# Patient Record
Sex: FEMALE | Race: OTHER | HISPANIC OR LATINO | URBAN - METROPOLITAN AREA
[De-identification: names, ages, dates, MRNs, and addresses within clinical notes are randomized per-mention and may not be internally consistent; named-entity substitution may affect disease eponyms.]

---

## 2017-02-17 ENCOUNTER — EMERGENCY (EMERGENCY)
Facility: HOSPITAL | Age: 42
LOS: 1 days | Discharge: HOME | End: 2017-02-17
Attending: EMERGENCY MEDICINE | Admitting: EMERGENCY MEDICINE
Payer: COMMERCIAL

## 2017-02-17 VITALS
TEMPERATURE: 98 F | RESPIRATION RATE: 18 BRPM | HEART RATE: 68 BPM | DIASTOLIC BLOOD PRESSURE: 69 MMHG | OXYGEN SATURATION: 99 % | SYSTOLIC BLOOD PRESSURE: 137 MMHG | WEIGHT: 175.05 LBS

## 2017-02-17 DIAGNOSIS — H92.01 OTALGIA, RIGHT EAR: ICD-10-CM

## 2017-02-17 DIAGNOSIS — H60.91 UNSPECIFIED OTITIS EXTERNA, RIGHT EAR: ICD-10-CM

## 2017-02-17 PROCEDURE — 99283 EMERGENCY DEPT VISIT LOW MDM: CPT

## 2017-02-17 RX ORDER — COLISTIN SULFATE, NEOMYCIN SULFATE, THONZONIUM BROMIDE AND HYDROCORTISONE ACETATE 3; 3.3; .5; 1 MG/ML; MG/ML; MG/ML; MG/ML
5 SUSPENSION AURICULAR (OTIC)
Qty: 1 | Refills: 0 | OUTPATIENT
Start: 2017-02-17 | End: 2017-02-24

## 2017-02-17 NOTE — ED PROVIDER NOTE - OBJECTIVE STATEMENT
pt to ed co pain to right ear with slight DC no fever no chills no decrease hearing to right side no other complaints no dizzy no aches no sweats no injury

## 2017-02-17 NOTE — ED PROVIDER NOTE - MEDICAL DECISION MAKING DETAILS
pt with swelling and DC from right ear for few days no fever no chills no other complaints will do drops and FU PMD

## 2017-02-17 NOTE — ED PROVIDER NOTE - ENMT, MLM
Airway patent, Nasal mucosa clear. Mouth with normal mucosa. Throat has no vesicles, no oropharyngeal exudates and uvula is midline.right ear canal swollen with mild white DC

## 2017-02-17 NOTE — ED ADULT NURSE NOTE - CHPI ED SYMPTOMS NEG
no syncope/no change in level of consciousness/no blurred vision/no numbness/dizziness/no vomiting/no weakness/no chills/no loss of consciousness/no nausea/no fever

## 2017-02-21 RX ORDER — COLISTIN SULFATE, NEOMYCIN SULFATE, THONZONIUM BROMIDE AND HYDROCORTISONE ACETATE 3; 3.3; .5; 1 MG/ML; MG/ML; MG/ML; MG/ML
5 SUSPENSION AURICULAR (OTIC)
Qty: 1 | Refills: 0
Start: 2017-02-21 | End: 2017-02-28

## 2019-06-16 ENCOUNTER — EMERGENCY (EMERGENCY)
Facility: HOSPITAL | Age: 44
LOS: 1 days | Discharge: HOME | End: 2019-06-16
Admitting: EMERGENCY MEDICINE
Payer: MEDICAID

## 2019-06-16 VITALS
DIASTOLIC BLOOD PRESSURE: 82 MMHG | WEIGHT: 164.69 LBS | TEMPERATURE: 98 F | HEART RATE: 76 BPM | RESPIRATION RATE: 14 BRPM | OXYGEN SATURATION: 100 % | SYSTOLIC BLOOD PRESSURE: 131 MMHG

## 2019-06-16 PROCEDURE — 99283 EMERGENCY DEPT VISIT LOW MDM: CPT | Mod: 25

## 2019-06-16 PROCEDURE — 73140 X-RAY EXAM OF FINGER(S): CPT | Mod: 26,RT

## 2019-06-16 PROCEDURE — 90471 IMMUNIZATION ADMIN: CPT

## 2019-06-16 PROCEDURE — 90715 TDAP VACCINE 7 YRS/> IM: CPT

## 2019-06-16 PROCEDURE — 73140 X-RAY EXAM OF FINGER(S): CPT

## 2019-06-16 PROCEDURE — 73140 X-RAY EXAM OF FINGER(S): CPT | Mod: 26

## 2019-06-16 RX ORDER — IBUPROFEN 200 MG
600 TABLET ORAL ONCE
Refills: 0 | Status: COMPLETED | OUTPATIENT
Start: 2019-06-16 | End: 2019-06-16

## 2019-06-16 RX ORDER — TETANUS TOXOID, REDUCED DIPHTHERIA TOXOID AND ACELLULAR PERTUSSIS VACCINE, ADSORBED 5; 2.5; 8; 8; 2.5 [IU]/.5ML; [IU]/.5ML; UG/.5ML; UG/.5ML; UG/.5ML
0.5 SUSPENSION INTRAMUSCULAR ONCE
Refills: 0 | Status: COMPLETED | OUTPATIENT
Start: 2019-06-16 | End: 2019-06-16

## 2019-06-16 RX ADMIN — Medication 600 MILLIGRAM(S): at 17:25

## 2019-06-16 RX ADMIN — TETANUS TOXOID, REDUCED DIPHTHERIA TOXOID AND ACELLULAR PERTUSSIS VACCINE, ADSORBED 0.5 MILLILITER(S): 5; 2.5; 8; 8; 2.5 SUSPENSION INTRAMUSCULAR at 17:26

## 2019-06-16 NOTE — ED PROVIDER NOTE - CLINICAL SUMMARY MEDICAL DECISION MAKING FREE TEXT BOX
42 yo f with no pmh c/o injury to R thumb. 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 Admits to pain. Denies numbness, tingling. Last tetanus unknown. R thumb- +avulsion of medial aspect of distal phalanx with corner of nail avulsion, NV intact, no active bleeding. 42 yo f with no pmh c/o injury to R thumb. 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 Admits to pain. Denies numbness, tingling. Last tetanus unknown. R thumb- +avulsion of medial aspect of distal phalanx with corner of nail avulsion, NV intact, no active bleeding. Xray unremarkable. Wound cleaned, surgicel and dressing applied. Tetanus updated.

## 2019-06-16 NOTE — ED PROVIDER NOTE - OBJECTIVE STATEMENT
44 yo f with no pmh c/o injury to R thumb. 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 Admits to pain. Denies numbness, tingling. Last tetanus unknown.

## 2019-06-16 NOTE — ED PROVIDER NOTE - PHYSICAL EXAMINATION
CONSTITUTIONAL: Well-appearing; well-nourished; in no apparent distress.   HEAD: Normocephalic; atraumatic.   EYES: PERRL; EOM intact; conjunctiva and sclera clear  ENT: normal nose; no rhinorrhea; normal pharynx with no erythema or lesions.   NECK: Supple; non-tender;   CARDIOVASCULAR: Normal S1, S2; no murmurs, rubs, or gallops. Regular rate and rhythm.   RESPIRATORY: Breathing easily; breath sounds clear and equal bilaterally; no wheezes, rhonchi, or rales.  MSK: FROM at all extremities, normal tone   EXT: No cyanosis or edema; N/V intact  SKIN: R thumb- +avulsion of medial aspect of distal phalanx with corner of nail avulsion, NV intact, no active bleeding.

## 2019-06-16 NOTE — ED ADULT NURSE NOTE - TEMPLATE
No significant improvement with PT. Pain appears more cervicogenic.  MRI neck ordered.  Will active his MyChart and we can discuss results upon receipt.    Alli Carreon DO, CAM  [facility]     Wounds

## 2019-06-16 NOTE — ED PROVIDER NOTE - NSFOLLOWUPINSTRUCTIONS_ED_ALL_ED_FT
Skin Avulsion    WHAT YOU NEED TO KNOW:    Skin avulsion is a wound that happens when skin is torn from your body during an accident or other injury. The torn skin may be lost or too damaged to be repaired, and it must be removed. A wound of this type cannot be stitched closed because there is tissue missing. Avulsion wounds are usually bigger and have more scars because of the missing tissue.    DISCHARGE INSTRUCTIONS:    Medicines:     Antibiotic ointment: Your healthcare provider may tell you to gently rub a topical antibiotic ointment on your wound. This will help prevent an infection and help your wound heal faster.       Pain medicine: You may be given medicine to take away or decrease pain. Do not wait until the pain is severe before you take your medicine.      NSAIDs, such as ibuprofen, help decrease swelling, pain, and fever. This medicine is available with or without a doctor's order. NSAIDs can cause stomach bleeding or kidney problems in certain people. If you take blood thinner medicine, always ask if NSAIDs are safe for you. Always read the medicine label and follow directions. Do not give these medicines to children under 6 months of age without direction from your child's healthcare provider.      Take your medicine as directed. Contact your healthcare provider if you think your medicine is not helping or if you have side effects. Tell him of her if you are allergic to any medicine. Keep a list of the medicines, vitamins, and herbs you take. Include the amounts, and when and why you take them. Bring the list or the pill bottles to follow-up visits. Carry your medicine list with you in case of an emergency.    Care for your wound: Avulsion wounds may take longer to heal because they cannot be closed with tape or stitches. Keep your wound clean and protected to prevent infection and speed healing.     Clean your wound: Wash your hands with soap and water before and after you care for your wound. You may be able to use a soft cloth to gently clean the wound after the first 24 to 48 hours. After that, gently clean the wound once or twice a day with cool water. Do not soak your wound. Use soap to clean around the wound, but try not to get any on the wound itself. Do not use alcohol or hydrogen peroxide to clean your wound unless you are directed to. Gently pat the area dry and reapply the bandage as directed.       Elevate your wound: Prop your injured area on pillows to raise it above the level of your heart. This will help reduce pain and swelling. Do this for 30 minutes at a time, as often as you can.       Bandage your wound: Bandages keep your wound clean, dry, and protected from infection. They may also prevent swelling. Use a bandage that does not stick to your wound, and has a spongy layer to absorb fluids. Leave your bandage on as long as directed. Ask your healthcare provider when and how to change your bandage. Do not wrap the bandage too tightly. This could cut off blood flow and cause more injury.       Use cool compresses: Wet a washcloth or towel with cool water and hold it on your wound as directed. Ask how often to apply the compress and for how long each time.      Reduce scarring: Avoid direct sunlight on your wound. Sunlight may burn or change the color of the new skin over your wound. Use sunscreen (SPF 30 or higher) on the new skin for at least 1 year after it heals.    Support for leg and arm wounds: You may need to use crutches if the wound is on your leg. You may need to use a sling if the wound is on your arm. Crutches and slings help protect the injured area, prevent further injury, and heal the area in the right position.     Follow up with your healthcare provider within 2 days or as directed: If you have stitches, ask when to return to have them removed. Write down your questions so you remember to ask them during your visits.     Contact your healthcare provider if:     You have new pain, or it gets worse.       You have trouble moving the injured body area.       Your wound splits open or does not seem to be healing.    Return to the emergency department if:     You have a fever.       You have painful swelling, redness, or warmth around your wound.      Your wound is red and there are red streaks on your skin starting at your wound and moving upward.       Your wound is draining pus.       You have heavy bleeding or bleeding that does not stop after 10 minutes of holding firm, direct pressure over the wound.      You feel like there is an object stuck in your wound.

## 2019-06-16 NOTE — ED PROVIDER NOTE - DIAGNOSTIC INTERPRETATION
ER PA: Angela Ross  hand xray INTERPRETATION:  no acute fracture; no soft tissue swelling noted; normal bony alignment.

## 2019-06-16 NOTE — ED ADULT NURSE NOTE - NSIMPLEMENTINTERV_GEN_ALL_ED
Implemented All Universal Safety Interventions:  [place] to call system. Call bell, personal items and telephone within reach. Instruct patient to call for assistance. Room bathroom lighting operational. Non-slip footwear when patient is off stretcher. Physically safe environment: no spills, clutter or unnecessary equipment. Stretcher in lowest position, wheels locked, appropriate side rails in place.

## 2019-06-16 NOTE — ED PROVIDER NOTE - CARE PROVIDER_API CALL
Reyna Rosa)  Plastic Surgery; Surgery  [address] [place] [address]  Phone: [phone]  Fax: [phone]  Follow Up Time:     Chon Howard)  Plastic Surgery; Surgery of the Hand  [address] [place] [address], [address]  [place] [address]  Phone: [phone]  Fax: [phone]  Follow Up Time:

## 2019-06-16 NOTE — ED ADULT TRIAGE NOTE - CHIEF COMPLAINT QUOTE
sliced tip of right thumb off when slicing roost beef at work; pressure dressing intact ; unknown last tetanus

## 2019-06-20 DIAGNOSIS — Z23 ENCOUNTER FOR IMMUNIZATION: ICD-10-CM
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
## 2019-06-20 DIAGNOSIS — S61.001A UNSPECIFIED OPEN WOUND OF RIGHT THUMB WITHOUT DAMAGE TO NAIL, INITIAL ENCOUNTER: ICD-10-CM
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
## 2019-06-20 DIAGNOSIS — S69.91XA UNSPECIFIED INJURY OF RIGHT WRIST, HAND AND FINGER(S), INITIAL ENCOUNTER: ICD-10-CM
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
## 2023-08-23 NOTE — ED ADULT NURSE NOTE - FALLEN IN THE PAST
no

## 2023-11-17 NOTE — ED PROVIDER NOTE - PRINCIPAL DIAGNOSIS
Assessment and Recommendations:  64y female with a past medical history/ocular history of psoriatic arthritis on methotrexate/Skyrizi, breast cancer s/p bilateral mastectomy, retinal scarring OS, high myopia consulted for left eye swelling, redness, and pain, found to have chemosis, temporal injection, and eye pain, with periorbital swelling, and findings on CT concerning for anterior scleritis and preseptal cellulitis.    #Likely preseptal cellulitis OS  - patient with cold symptoms prior to presentation, but denies fevers, new medications, rashes, trauma, no recent vaccinations, no new face creams  - swelling has progressively gotten worse and patient complaining of left eye pain. Says periorbital swelling and redness occurred before eye became red.  - Today VA stable at 20/40 OS (has had chronic poor vision due to retinal scarring), periorbital and eyelid swelling and erythema continues to improve  - CT scan showing preseptal inflammation   - Okay to transition to PO abx after last dose of IV abx on 11/17   - Continue oral NSAID (flurbiprofen 100mg TID or naproxen 500 mg BID) with PPI (omeprazole 20mg qdaily) through Sunday   - Continue Maxitrol once daily OS through Sunday   - Inflam workup: ANCA, uric acid, RF, ACE, syphilis, lyme negative,   - ESR and CRP elevated  - Continue preservative free artificial tears 6 times a day both eyes    #Forniceal pigmentation, OS  - may require biopsy as pigmentation in the fornix can be concerning   - presentation not able to fully appreciate if pigmentation as patient has many small hemorrhages and petechiae  - should have outpatient follow up next week    Outpatient Follow-up: Patient should follow-up with his/her ophthalmologist or with [facility] Department of Ophthalmology within 1 week of after discharge at:    [address]  [place] [address]  [phone]    Silvana Zhu MD, PGY3  Also available on [employer] Teams     Avulsion of finger tip, initial encounter